# Patient Record
Sex: FEMALE | Race: ASIAN | NOT HISPANIC OR LATINO | URBAN - METROPOLITAN AREA
[De-identification: names, ages, dates, MRNs, and addresses within clinical notes are randomized per-mention and may not be internally consistent; named-entity substitution may affect disease eponyms.]

---

## 2020-12-23 ENCOUNTER — EMERGENCY (EMERGENCY)
Facility: HOSPITAL | Age: 70
LOS: 1 days | Discharge: HOME | End: 2020-12-23
Attending: EMERGENCY MEDICINE
Payer: MEDICARE

## 2020-12-23 VITALS
SYSTOLIC BLOOD PRESSURE: 128 MMHG | HEART RATE: 63 BPM | OXYGEN SATURATION: 100 % | TEMPERATURE: 98 F | RESPIRATION RATE: 18 BRPM | DIASTOLIC BLOOD PRESSURE: 71 MMHG

## 2020-12-23 VITALS
DIASTOLIC BLOOD PRESSURE: 77 MMHG | HEIGHT: 63 IN | WEIGHT: 115.08 LBS | SYSTOLIC BLOOD PRESSURE: 157 MMHG | HEART RATE: 67 BPM | TEMPERATURE: 98 F | OXYGEN SATURATION: 98 % | RESPIRATION RATE: 18 BRPM

## 2020-12-23 PROCEDURE — 99284 EMERGENCY DEPT VISIT MOD MDM: CPT | Mod: GC

## 2020-12-23 PROCEDURE — 70450 CT HEAD/BRAIN W/O DYE: CPT | Mod: 26

## 2020-12-23 PROCEDURE — 72100 X-RAY EXAM L-S SPINE 2/3 VWS: CPT | Mod: 26

## 2020-12-23 PROCEDURE — 70450 CT HEAD/BRAIN W/O DYE: CPT

## 2020-12-23 PROCEDURE — 99284 EMERGENCY DEPT VISIT MOD MDM: CPT | Mod: 25

## 2020-12-23 PROCEDURE — 72100 X-RAY EXAM L-S SPINE 2/3 VWS: CPT

## 2020-12-23 RX ORDER — ACETAMINOPHEN 500 MG
975 TABLET ORAL ONCE
Refills: 0 | Status: COMPLETED | OUTPATIENT
Start: 2020-12-23 | End: 2020-12-23

## 2020-12-23 RX ADMIN — Medication 975 MILLIGRAM(S): at 21:10

## 2020-12-23 NOTE — ED PROVIDER NOTE - PATIENT PORTAL LINK FT
You can access the FollowMyHealth Patient Portal offered by [facility] by registering at the following website: [email or web address]. By joining [facility]’s FollowMyHealth portal, you will also be able to view your health information using other applications (apps) compatible with our system.

## 2020-12-23 NOTE — ED PROVIDER NOTE - CLINICAL SUMMARY MEDICAL DECISION MAKING FREE TEXT BOX
Attn - pt with R lower back pain with tenderness in R sciatic notch and + SLR.  subsequent fall - xray to r/o fx,  ct head - occipital hematoma.  analgesia and reassess. Attn - pt with R lower back pain with tenderness in R sciatic notch and + SLR.  subsequent fall - xray to r/o fx,  ct head - occipital hematoma.  analgesia and reassess.    Xray lumbar shows no acute fracture or dislocation; pt with disc space narrowing at L4-5 and L5-S1 with loss of lordosis.   CT brain noncontrast shows no acute abnormality, no hemorrhage or skull fracture.   Patient will follow up with spine specialist. Sent medrol-dose pack to pharmacy.   Given return precautions.

## 2020-12-23 NOTE — ED PROVIDER NOTE - OBJECTIVE STATEMENT
Attn - pt seen in OR7 - pt c/o R lower back/ gluteal pain with radiation to right leg since yesterday.  today pt had severe pain and caused patient to get weak and fell down approx 3 steps and struck back of head.  NO: loc, n/v, dizziness, numbness, weakness, b/b dysfunction, saddle anesthesia, fever.  pt states was doing a lot of lifting the day before.

## 2020-12-23 NOTE — ED PROVIDER NOTE - NSFOLLOWUPINSTRUCTIONS_ED_ALL_ED_FT
Your were seen in the ER for back pain.   Make an appt with the spine center within a few days. Call [phone] for an appt.     Please take tylenol or motrin as directed as needed. Please take medrol-dose pack as directed.     Please return to the ER for any worsening or new symptoms including lower leg weakness, leg numbness, urinary or bowel problems, headache, or vision changes.

## 2020-12-23 NOTE — ED PROVIDER NOTE - PHYSICAL EXAMINATION
Attn - alert, mod pain.  head - occipital hematoma approx 3 cm diam. PERRL 2 mm, neck-, back/spine-, chest/ribs-, abdo - soft, NT, ND, no CVAT.  pelvis- NT, tender R sciatic notch and + SLR on Right, no hip tenderness.  sensation lower extrem - intact, motor 5/5, =, skin - no abrasions.

## 2020-12-23 NOTE — ED ADULT NURSE NOTE - OBJECTIVE STATEMENT
The pt is a 69 y/o F PMH HTN presenting to the ED c/o lower back pain s/p fall at home. The patient reports she was experiencing lower back pain when her "legs came out from under me." Upon assessment, pt is AO x 4, speaking in full and complete sentences, s1 s2 heart sounds, abd soft and nontender, bowel sounds present in all four quadrants, equal strength bilaterally, skin warm, dry and intact, presenting with a hematoma to the parietal region. present peripheral pulses, PERRL. Pt denies fever, chills, n/v, urinary symptoms, CP, dizziness, weakness, HA, SOB, abd pain. Pt positioned for comfort, appropriate side rails raised, wheels locked, bed in lowest position, pt denies needs at this time.

## 2020-12-23 NOTE — ED PROVIDER NOTE - PMH
Addended by: KELLEE OLMOS on: 7/3/2019 12:59 PM     Modules accepted: Orders     <<----- Click to add NO pertinent Past Medical History No pertinent past medical history

## 2020-12-23 NOTE — ED PROVIDER NOTE - CARE PLAN
Principal Discharge DX:	Sciatica of right side  Secondary Diagnosis:	Fall, initial encounter  Secondary Diagnosis:	Scalp hematoma, initial encounter

## 2020-12-23 NOTE — ED ADULT TRIAGE NOTE - CHIEF COMPLAINT QUOTE
Pt c/o back pain since yesterday, and fell today, states feet gave away from the back pain.  No dizziness,  No LOC.  No blood thinners.

## 2020-12-28 PROBLEM — Z78.9 OTHER SPECIFIED HEALTH STATUS: Chronic | Status: ACTIVE | Noted: 2020-12-23

## 2020-12-28 PROBLEM — Z00.00 ENCOUNTER FOR PREVENTIVE HEALTH EXAMINATION: Status: ACTIVE | Noted: 2020-12-28

## 2021-01-04 ENCOUNTER — APPOINTMENT (OUTPATIENT)
Dept: SPINE | Facility: CLINIC | Age: 71
End: 2021-01-04
Payer: MEDICARE

## 2021-01-04 VITALS
BODY MASS INDEX: 20.38 KG/M2 | SYSTOLIC BLOOD PRESSURE: 143 MMHG | HEART RATE: 60 BPM | DIASTOLIC BLOOD PRESSURE: 71 MMHG | TEMPERATURE: 98 F | WEIGHT: 115 LBS | OXYGEN SATURATION: 96 % | HEIGHT: 63 IN

## 2021-01-04 DIAGNOSIS — Z86.79 PERSONAL HISTORY OF OTHER DISEASES OF THE CIRCULATORY SYSTEM: ICD-10-CM

## 2021-01-04 PROCEDURE — 99203 OFFICE O/P NEW LOW 30 MIN: CPT

## 2021-01-04 PROCEDURE — 99072 ADDL SUPL MATRL&STAF TM PHE: CPT

## 2021-01-04 RX ORDER — ATENOLOL 50 MG/1
TABLET ORAL
Refills: 0 | Status: ACTIVE | COMMUNITY

## 2021-01-04 RX ORDER — AMLODIPINE BESYLATE 10 MG/1
10 TABLET ORAL
Refills: 0 | Status: ACTIVE | COMMUNITY

## 2021-01-04 NOTE — REVIEW OF SYSTEMS
[Leg Weakness] : leg weakness [Numbness] : numbness [Tingling] : tingling [Difficulty Walking] : difficulty walking [Inability to Walk] : inability to walk [Negative] : Heme/Lymph [de-identified] : right sided back and leg pain

## 2021-01-04 NOTE — CONSULT LETTER
[Dear  ___] : Dear  [unfilled], [Courtesy Letter:] : I had the pleasure of seeing your patient, [unfilled], in my office today. [Please see my note below.] : Please see my note below. [Sincerely,] : Sincerely, [FreeTextEntry3] : Kwesi Briggs MD\par Chief of Neurosurgery [facility]\par [facility]\par

## 2021-01-04 NOTE — ASSESSMENT
[FreeTextEntry1] : Right sided lumbar radiculopathy .  A Lumbar MRI was ordered to assess for a herniated disc or stenosis.  She will continue her Alleve.  Tramadol will be prescribed and side effects and use were explained.  Once the MRI is complete she will return.  \par \par \par CC:\par JULIO Silva MD\par [address] [facility].\par [place]

## 2021-01-04 NOTE — HISTORY OF PRESENT ILLNESS
[FreeTextEntry1] : Right sided lumbar radiculopathy  [de-identified] : This is a 70 year old female who fell and hit her head three weeks ago.  She did not have LOC and went to the ED at [facility].  She has prior right sided back and leg pain with weakness and unable to stand sit and walk.  She fell due to her back pain and the pain is a 10/10.  She has trouble sleeping.   The pain is associated with pain, tingling, numbness which radiates into the right foot.   She has tried medrol dose galo, alleve, icy hot, voltaren gel galo and hot packs.   No urine or stool incontinence.   Lumbar xrays show no fracture and no instability.  There are no left leg symptoms.

## 2021-01-04 NOTE — REASON FOR VISIT
[New Patient Visit] : a new patient visit [Family Member] : family member [Other: _____] : [unfilled] [Referred By: _________] : Patient was referred by SUZANNE [FreeTextEntry1] : Rigth sided lumbar radiculopathy

## 2021-01-04 NOTE — PHYSICAL EXAM
[Person] : oriented to person [Place] : oriented to place [Time] : oriented to time [Short Term Intact] : short term memory intact [Remote Intact] : remote memory intact [Span Intact] : the attention span was normal [Concentration Intact] : normal concentrating ability [Fluency] : fluency intact [Comprehension] : comprehension intact [Current Events] : adequate knowledge of current events [Past History] : adequate knowledge of personal past history [Vocabulary] : adequate range of vocabulary [Cranial Nerves Optic (II)] : visual acuity intact bilaterally,  pupils equal round and reactive to light [Cranial Nerves Oculomotor (III)] : extraocular motion intact [Cranial Nerves Trigeminal (V)] : facial sensation intact symmetrically [Cranial Nerves Facial (VII)] : face symmetrical [Cranial Nerves Vestibulocochlear (VIII)] : hearing was intact bilaterally [Cranial Nerves Glossopharyngeal (IX)] : tongue and palate midline [Cranial Nerves Accessory (XI - Cranial And Spinal)] : head turning and shoulder shrug symmetric [Cranial Nerves Hypoglossal (XII)] : there was no tongue deviation with protrusion [Motor Tone] : muscle tone was normal in all four extremities [Motor Strength] : muscle strength was normal in all four extremities [No Muscle Atrophy] : normal bulk in all four extremities [Sensation Tactile Decrease] : light touch was intact [Abnormal Walk] : normal gait [Balance] : balance was intact [Past-pointing] : there was no past-pointing [Tremor] : no tremor present [3+] : Patella left 3+ [2+] : Ankle jerk left 2+ [Plantar Reflex Right Only] : normal on the right [Plantar Reflex Left Only] : normal on the left [Smith] : Smith's sign was not demonstrated [No Tenderness to Palpation] : no spine tenderness on palpation [Straight-Leg Raise Test - Left] : straight leg raise of the left leg was negative [Straight-Leg Raise Test - Right] : straight leg raise  of the right leg was negative [Able to toe walk] : the patient was not able to toe walk [Able to heel walk] : the patient was not able to heel walk [FreeTextEntry1] : Mildy positive right Michelle's

## 2021-01-07 RX ORDER — TRAMADOL HYDROCHLORIDE AND ACETAMINOPHEN 37.5; 325 MG/1; MG/1
37.5-325 TABLET, FILM COATED ORAL
Qty: 30 | Refills: 0 | Status: ACTIVE | COMMUNITY
Start: 2021-01-04 | End: 1900-01-01

## 2021-01-12 ENCOUNTER — OUTPATIENT (OUTPATIENT)
Dept: OUTPATIENT SERVICES | Facility: HOSPITAL | Age: 71
LOS: 1 days | End: 2021-01-12
Payer: MEDICARE

## 2021-01-12 ENCOUNTER — APPOINTMENT (OUTPATIENT)
Dept: MRI IMAGING | Facility: CLINIC | Age: 71
End: 2021-01-12
Payer: MEDICARE

## 2021-01-12 DIAGNOSIS — M54.16 RADICULOPATHY, LUMBAR REGION: ICD-10-CM

## 2021-01-12 PROCEDURE — 72148 MRI LUMBAR SPINE W/O DYE: CPT | Mod: 26

## 2021-01-12 PROCEDURE — 72148 MRI LUMBAR SPINE W/O DYE: CPT

## 2021-01-14 ENCOUNTER — APPOINTMENT (OUTPATIENT)
Dept: SPINE | Facility: CLINIC | Age: 71
End: 2021-01-14
Payer: MEDICARE

## 2021-01-14 VITALS
HEART RATE: 60 BPM | WEIGHT: 115 LBS | DIASTOLIC BLOOD PRESSURE: 74 MMHG | BODY MASS INDEX: 20.38 KG/M2 | SYSTOLIC BLOOD PRESSURE: 128 MMHG | HEIGHT: 63 IN | OXYGEN SATURATION: 98 % | TEMPERATURE: 97.7 F

## 2021-01-14 DIAGNOSIS — Z78.9 OTHER SPECIFIED HEALTH STATUS: ICD-10-CM

## 2021-01-14 DIAGNOSIS — M48.00 SPINAL STENOSIS, SITE UNSPECIFIED: ICD-10-CM

## 2021-01-14 DIAGNOSIS — M54.16 RADICULOPATHY, LUMBAR REGION: ICD-10-CM

## 2021-01-14 PROCEDURE — 99072 ADDL SUPL MATRL&STAF TM PHE: CPT

## 2021-01-14 PROCEDURE — 99213 OFFICE O/P EST LOW 20 MIN: CPT

## 2021-01-14 NOTE — PHYSICAL EXAM
[Motor Strength] : muscle strength was normal in all four extremities [Abnormal Walk] : normal gait [1+] : Patella right 1+ [2+] : Ankle jerk left 2+

## 2021-01-15 NOTE — REASON FOR VISIT
[Follow-Up: _____] : a [unfilled] follow-up visit [Family Member] : family member [FreeTextEntry1] : 70 year old female with a history of lumbar radiculopathy on the right side.  She has been placed on Tramadol that has been somewhat effective .  She has severe pain in the back and right leg at times rated at 10/10.   She reports weakness of her right leg and difficulty walking.  Her right leg is painful with tingling and numbness and she has balance disturbances.  An MRI scan shows L4-5 central and right foraminal stenosis.She has not had any physical therapy or pain management.

## 2021-01-15 NOTE — ASSESSMENT
[FreeTextEntry1] : 70 year old female with lumbar foraminal stenosis and mild central stenosis at the L 4 L 5 level.  She may continue  Tramadol and complete a six week trial of PT.  Epidural injections are an option as well prior to considering surgical options.   No heavy lifting was  advised.  She will start PT  and return to the office in six weeks.  She should not work at this time and no bending. lifting or twisting.

## 2021-01-15 NOTE — REVIEW OF SYSTEMS
[Numbness] : numbness [Tingling] : tingling [Negative] : Heme/Lymph [de-identified] : back and leg pain

## 2021-03-04 ENCOUNTER — APPOINTMENT (OUTPATIENT)
Dept: SPINE | Facility: CLINIC | Age: 71
End: 2021-03-04